# Patient Record
Sex: FEMALE | Race: WHITE | NOT HISPANIC OR LATINO | Employment: OTHER | URBAN - METROPOLITAN AREA
[De-identification: names, ages, dates, MRNs, and addresses within clinical notes are randomized per-mention and may not be internally consistent; named-entity substitution may affect disease eponyms.]

---

## 2018-03-12 ENCOUNTER — OFFICE VISIT (OUTPATIENT)
Dept: ORTHOPEDIC SURGERY | Facility: CLINIC | Age: 68
End: 2018-03-12

## 2018-03-12 DIAGNOSIS — M17.10 ARTHRITIS OF KNEE: Primary | ICD-10-CM

## 2018-03-12 PROCEDURE — 99204 OFFICE O/P NEW MOD 45 MIN: CPT | Performed by: ORTHOPAEDIC SURGERY

## 2018-03-12 PROCEDURE — 20610 DRAIN/INJ JOINT/BURSA W/O US: CPT | Performed by: ORTHOPAEDIC SURGERY

## 2018-03-12 RX ORDER — LISINOPRIL 5 MG/1
5 TABLET ORAL
Refills: 2 | COMMUNITY
Start: 2018-03-07

## 2018-03-12 RX ORDER — TRAMADOL HYDROCHLORIDE 50 MG/1
50 TABLET ORAL EVERY 4 HOURS PRN
Qty: 60 TABLET | Refills: 0 | Status: SHIPPED | OUTPATIENT
Start: 2018-03-12

## 2018-03-12 RX ORDER — IBUPROFEN 800 MG/1
800 TABLET ORAL 3 TIMES DAILY
Qty: 90 TABLET | Refills: 0 | Status: SHIPPED | OUTPATIENT
Start: 2018-03-12

## 2018-03-12 RX ADMIN — LIDOCAINE HYDROCHLORIDE 2 ML: 10 INJECTION, SOLUTION INFILTRATION; PERINEURAL at 09:43

## 2018-03-12 RX ADMIN — METHYLPREDNISOLONE ACETATE 160 MG: 80 INJECTION, SUSPENSION INTRA-ARTICULAR; INTRALESIONAL; INTRAMUSCULAR; SOFT TISSUE at 09:43

## 2018-03-12 NOTE — PROGRESS NOTES
"Patient: Kathryn Escobedo    YOB: 1950    Medical Record Number: [id number]    Chief Complaints:  Right knee pain    History of Present Illness:     67 y.o. female patient who presents for evaluation of her right knee.  She reports a long history of worsening pain and dysfunction.  Her symptoms have gotten especially worse over the past 5 months.  She does not recall any discrete persisting event or factor.  She describes her pain as moderate to severe, intermittent and aching although occasionally stabbing in character.  She has noticed occasional swelling.  Denies any mechanical symptoms.  Standing and walking seem to aggravate her symptoms.  Rest and anti-inflammatories do help somewhat.  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  She just came back to [place] recently.  She had an injection in the right knee that was done in [place] in December.  This helped tremendously.    Allergies:   Allergies   Allergen Reactions   • Naproxen Dizziness and Unknown (See Comments)     Foam from the mouth        Medications:   Home Medications    Current Outpatient Prescriptions:   •  lisinopril (PRINIVIL,ZESTRIL) 5 MG tablet, Take 5 mg by mouth every night at bedtime., Disp: , Rfl: 2    History reviewed. No pertinent past medical history.    Past Surgical History:   Procedure Laterality Date   • KNEE ARTHROSCOPY Left    • KNEE SURGERY Right        Social History     Occupational History   • Not on file.     Social History Main Topics   • Smoking status: Not on file   • Smokeless tobacco: Not on file   • Alcohol use Not on file   • Drug use: Unknown   • Sexual activity: Not on file      Social History     Social History Narrative   • No narrative on file       History reviewed. No pertinent family history.    Review of Systems:      Constitutional: Denies fever, shaking or chills   Eyes: Denies change in visual acuity   HEENT: Denies nasal congestion or " sore throat   Respiratory: Denies cough or shortness of breath   Cardiovascular: Denies chest pain or edema  Endocrine: Denies tremors, palpitations, intolerance of heat or cold, polyuria, polydipsia.  GI: Denies abdominal pain, nausea, vomiting, bloody stools or diarrhea  : Denies frequency, urgency, incontinence, retention, or nocturia.  Musculoskeletal: Denies numbness tingling or loss of motor function except as above  Integument: Denies rash, lesion or ulceration   Neurologic: Denies headache or focal weakness, deficits  Heme: Denies epistaxis, spontaneous or excessive bleeding, epistaxis, hematuria, melena, fatigue, enlarged or tender lymph nodes.      All other pertinent positives and negatives as noted above in HPI.    Physical Exam: 67 y.o. female     Height:  64 inches  Weight:  252  Temp 97.6    General:  Patient is awake and alert.  Appears in no acute distress or discomfort.    Psych:  Affect and demeanor are appropriate.    Eyes:  Conjunctiva and sclera appear grossly normal.  Eyes track well and EOM seem to be intact.    Ears:  No gross abnormalities.  Hearing adequate for the exam.    Cardiovascular:  Regular rate and rhythm.    Lungs:  Good chest expansion.  Breathing unlabored.    Spine:  Back appears grossly normal.  No palpable masses or adenopathy.  Good motion.  Straight leg raise and crossed straight leg raise manuever are both negative for lower leg and/or knee pain.    Extremities:  Skin is benign.  No obvious gross abnormalities about right knee.  No palpable masses or adenopathy.  Moderate tenderness noted over medial joint line.  Motion is to 105° of flexion, 5° shy of full extension.  No instability although she does have some pseudo-laxity of the MCL.  Strength is well preserved including hip flexion, knee extension, ankle and toe plantarflexion, ankle inversion and eversion.  Good sensory function throughout the leg and foot.  Palpable pulses.         Radiology:   Bilateral standing  AP views, bilateral merchants views and a lateral view of the right knee are ordered by myself and reviewed to evaluate the patient's complaint.  No comparison films are immediately available.  The x-rays show significant degenerative arthritis including bone-on-bone arthritis, osteophyte formation, and subchondral sclerosis.  The majority of the degenerative changes appear to involve the medial compartment.    Assessment/Plan:  Right knee osteoarthritis    We discussed treatment options in detail including the risks, benefits, and alternatives of conservative treatment versus surgical options.  Regarding conservative treatment, we discussed appropriate activity modifications, anti-inflammatories, injections, and physical therapy.  We also discussed the option of an arthroplasty and all that would entail.  She had a lot of questions about the arthroplasty that she was given by some friends of hers in [place].  We went through those in detail.  I have recommended that we continue to manage her conservatively.  She responded well to an injection in December and I think that repeating that is very reasonable.  Certainly she is headed towards an arthroplasty in the future but I would hope to put that off as long as possible.    The patient has acknowledged understanding of the information and elected for a repeat injection.  The risks, benefits, and alternatives were discussed and she consented.  I have also agreed to give her prescriptions for both tramadol and ibuprofen to take as needed.  The risks were discussed.  She does have a history of allergy to naproxen but she tells me that she has taken ibuprofen over-the-counter in the past and responded well to that.  Going forward, she will follow up as needed.    Large Joint Arthrocentesis  Date/Time: 3/12/2018 9:43 AM  Consent given by: patient  Site marked: site marked  Timeout: Immediately prior to procedure a time out was called to verify the correct patient,  procedure, equipment, support staff and site/side marked as required   Supporting Documentation  Indications: pain and joint swelling   Procedure Details  Location: knee - R knee  Preparation: Patient was prepped and draped in the usual sterile fashion  Needle size: 25 G (21 G)  Approach: anterolateral  Medications administered: 2 mL lidocaine 1 %; 160 mg methylPREDNISolone acetate 80 MG/ML  Patient tolerance: patient tolerated the procedure well with no immediate complications        Yvon Phillips MD    03/12/2018    CC to NAVIN Grady

## 2018-03-13 RX ORDER — LIDOCAINE HYDROCHLORIDE 10 MG/ML
2 INJECTION, SOLUTION INFILTRATION; PERINEURAL
Status: COMPLETED | OUTPATIENT
Start: 2018-03-12 | End: 2018-03-12

## 2018-03-13 RX ORDER — METHYLPREDNISOLONE ACETATE 80 MG/ML
160 INJECTION, SUSPENSION INTRA-ARTICULAR; INTRALESIONAL; INTRAMUSCULAR; SOFT TISSUE
Status: COMPLETED | OUTPATIENT
Start: 2018-03-12 | End: 2018-03-12